# Patient Record
Sex: FEMALE | Race: BLACK OR AFRICAN AMERICAN | NOT HISPANIC OR LATINO | Employment: FULL TIME | URBAN - METROPOLITAN AREA
[De-identification: names, ages, dates, MRNs, and addresses within clinical notes are randomized per-mention and may not be internally consistent; named-entity substitution may affect disease eponyms.]

---

## 2024-02-15 DIAGNOSIS — Z12.31 OTHER SCREENING MAMMOGRAM: ICD-10-CM

## 2024-03-02 ENCOUNTER — PATIENT MESSAGE (OUTPATIENT)
Dept: ADMINISTRATIVE | Facility: HOSPITAL | Age: 59
End: 2024-03-02
Payer: COMMERCIAL

## 2024-03-02 DIAGNOSIS — I10 ESSENTIAL HYPERTENSION: ICD-10-CM

## 2024-03-02 DIAGNOSIS — E78.2 MIXED HYPERLIPIDEMIA: ICD-10-CM

## 2024-03-02 NOTE — TELEPHONE ENCOUNTER
No care due was identified.  [facility] Embedded Care Due Messages. Reference number: [id number].   3/02/2024 12:44:25 PM CST

## 2024-03-02 NOTE — TELEPHONE ENCOUNTER
No care due was identified.  [facility] Embedded Care Due Messages. Reference number: [id number].   3/02/2024 12:19:12 PM CST

## 2024-03-03 RX ORDER — LOSARTAN POTASSIUM 50 MG/1
50 TABLET ORAL DAILY
Qty: 90 TABLET | Refills: 2 | Status: SHIPPED | OUTPATIENT
Start: 2024-03-03

## 2024-03-03 RX ORDER — LOSARTAN POTASSIUM 50 MG/1
50 TABLET ORAL
Qty: 90 TABLET | Refills: 2 | OUTPATIENT
Start: 2024-03-03

## 2024-03-03 RX ORDER — ATORVASTATIN CALCIUM 20 MG/1
20 TABLET, FILM COATED ORAL DAILY
Qty: 90 TABLET | Refills: 2 | Status: SHIPPED | OUTPATIENT
Start: 2024-03-03

## 2024-03-03 RX ORDER — ATORVASTATIN CALCIUM 20 MG/1
20 TABLET, FILM COATED ORAL
Qty: 90 TABLET | Refills: 2 | OUTPATIENT
Start: 2024-03-03

## 2024-03-03 NOTE — TELEPHONE ENCOUNTER
Refill Decision Note   Rachael Alexis  is requesting a refill authorization.  Brief Assessment and Rationale for Refill:  Approve     Medication Therapy Plan:        Comments:     Note composed:2:39 AM 03/03/2024

## 2024-03-03 NOTE — TELEPHONE ENCOUNTER
Refill Decision Note   Rachael Reuben  is requesting a refill authorization.  Brief Assessment and Rationale for Refill:  Quick Discontinue     Medication Therapy Plan:  Duplicate      Comments:     Note composed:2:39 AM 03/03/2024

## 2024-03-05 ENCOUNTER — PATIENT OUTREACH (OUTPATIENT)
Dept: ADMINISTRATIVE | Facility: HOSPITAL | Age: 59
End: 2024-03-05
Payer: COMMERCIAL

## 2024-03-05 ENCOUNTER — PATIENT MESSAGE (OUTPATIENT)
Dept: ADMINISTRATIVE | Facility: HOSPITAL | Age: 59
End: 2024-03-05
Payer: COMMERCIAL

## 2024-03-05 NOTE — PROGRESS NOTES
Population Health Chart Review & Patient Outreach Details      Additional Pop Health Notes:               Updates Requested / Reviewed:             Health Maintenance Topics Overdue:    Health Maintenance Due   Topic Date Due    Cervical Cancer Screening  06/19/2020    COVID-19 Vaccine (3 - 2023-24 season) 09/01/2023    Mammogram  02/09/2024         Health Maintenance Topic(s) Outreach Outcomes & Actions Taken:    Cervical Cancer Screening - Outreach Outcomes & Actions Taken  : portal sent

## 2024-06-01 ENCOUNTER — PATIENT MESSAGE (OUTPATIENT)
Dept: ADMINISTRATIVE | Facility: HOSPITAL | Age: 59
End: 2024-06-01
Payer: COMMERCIAL

## 2024-06-07 ENCOUNTER — HOSPITAL ENCOUNTER (OUTPATIENT)
Dept: RADIOLOGY | Facility: HOSPITAL | Age: 59
Discharge: HOME | End: 2024-06-07
Attending: STUDENT IN AN ORGANIZED HEALTH CARE EDUCATION/TRAINING PROGRAM
Payer: COMMERCIAL

## 2024-06-07 VITALS — WEIGHT: 190 LBS | BODY MASS INDEX: 33.66 KG/M2 | HEIGHT: 63 IN

## 2024-06-07 DIAGNOSIS — Z12.31 OTHER SCREENING MAMMOGRAM: ICD-10-CM

## 2024-06-07 PROCEDURE — 77067 SCR MAMMO BI INCL CAD: CPT | Mod: TC

## 2024-06-07 PROCEDURE — 77063 BREAST TOMOSYNTHESIS BI: CPT | Mod: 26,,, | Performed by: RADIOLOGY

## 2024-06-07 PROCEDURE — 77067 SCR MAMMO BI INCL CAD: CPT | Mod: 26,,, | Performed by: RADIOLOGY

## 2024-06-07 PROCEDURE — 77063 BREAST TOMOSYNTHESIS BI: CPT | Mod: TC

## 2024-06-12 ENCOUNTER — OFFICE VISIT (OUTPATIENT)
Dept: OBSTETRICS AND GYNECOLOGY | Facility: CLINIC | Age: 59
End: 2024-06-12
Payer: COMMERCIAL

## 2024-06-12 VITALS
BODY MASS INDEX: 35.27 KG/M2 | HEIGHT: 63 IN | DIASTOLIC BLOOD PRESSURE: 86 MMHG | SYSTOLIC BLOOD PRESSURE: 146 MMHG | WEIGHT: 199.06 LBS

## 2024-06-12 DIAGNOSIS — Z01.419 WELL WOMAN EXAM WITH ROUTINE GYNECOLOGICAL EXAM: Primary | ICD-10-CM

## 2024-06-12 PROCEDURE — 99386 PREV VISIT NEW AGE 40-64: CPT | Mod: S$GLB,,, | Performed by: OBSTETRICS & GYNECOLOGY

## 2024-06-12 PROCEDURE — 4010F ACE/ARB THERAPY RXD/TAKEN: CPT | Mod: CPTII,S$GLB,, | Performed by: OBSTETRICS & GYNECOLOGY

## 2024-06-12 PROCEDURE — 1160F RVW MEDS BY RX/DR IN RCRD: CPT | Mod: CPTII,S$GLB,, | Performed by: OBSTETRICS & GYNECOLOGY

## 2024-06-12 PROCEDURE — 99999 PR PBB SHADOW E&M-EST. PATIENT-LVL III: CPT | Mod: PBBFAC,,, | Performed by: OBSTETRICS & GYNECOLOGY

## 2024-06-12 PROCEDURE — 1159F MED LIST DOCD IN RCRD: CPT | Mod: CPTII,S$GLB,, | Performed by: OBSTETRICS & GYNECOLOGY

## 2024-06-12 PROCEDURE — 3008F BODY MASS INDEX DOCD: CPT | Mod: CPTII,S$GLB,, | Performed by: OBSTETRICS & GYNECOLOGY

## 2024-06-12 PROCEDURE — 3079F DIAST BP 80-89 MM HG: CPT | Mod: CPTII,S$GLB,, | Performed by: OBSTETRICS & GYNECOLOGY

## 2024-06-12 PROCEDURE — 3077F SYST BP >= 140 MM HG: CPT | Mod: CPTII,S$GLB,, | Performed by: OBSTETRICS & GYNECOLOGY

## 2024-06-12 NOTE — PROGRESS NOTES
CC: Annual check-up    SUBJECTIVE:   58 y.o. female   for annual routine Pap and checkup. No LMP recorded. Patient is postmenopausal..  She has no unusual complaints.    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, thinking about retiring after 25 yrs, was a teacher for 19 yrs  Past Medical History:   Diagnosis Date    Diverticulitis     Family history of breast cancer 2017    Hyperlipidemia     Hypertension      Past Surgical History:   Procedure Laterality Date    APPENDECTOMY      CHOLECYSTECTOMY      COLONOSCOPY N/A 2017    Procedure: COLONOSCOPY;  Surgeon: Yohan Berrios Jr., MD;  Location: [facility];  Service: Endoscopy;  Laterality: N/A;    SALPINGOOPHORECTOMY Right      Social History     Socioeconomic History    Marital status:    Occupational History    Occupation: Teacher     Comment: [employer]   Tobacco Use    Smoking status: Every Day     Current packs/day: 0.06     Types: Cigarettes    Smokeless tobacco: Never   Substance and Sexual Activity    Alcohol use: Yes     Alcohol/week: 0.0 standard drinks of alcohol    Drug use: No    Sexual activity: Yes     Partners: Male     Family History   Problem Relation Name Age of Onset    Cancer Mother      Breast cancer Mother      Breast cancer Paternal Grandmother       OB History    Para Term  AB Living   2 2 2         SAB IAB Ectopic Multiple Live Births                  # Outcome Date GA Lbr Jeyson/2nd Weight Sex Type Anes PTL Lv   2 Term            1 Term                  Current Outpatient Medications   Medication Sig Dispense Refill    atorvastatin (LIPITOR) 20 MG tablet Take 1 tablet (20 mg total) by mouth once daily. 90 tablet 2    hydrocortisone 2.5 % cream Apply topically 2 (two) times daily. 28 g 1    losartan (COZAAR) 50 MG tablet Take 1 tablet (50 mg total) by mouth once  "daily. 90 tablet 2    hydrOXYzine HCL (ATARAX) 25 MG tablet Take 1 tablet (25 mg total) by mouth 3 (three) times daily as needed for Itching. (Patient not taking: Reported on 6/12/2024) 30 tablet 1    triamcinolone acetonide 0.5% (KENALOG) 0.5 % Crea Apply topically 2 (two) times daily. (Patient not taking: Reported on 6/12/2024) 15 g 3     No current facility-administered medications for this visit.     Allergies: Pcn [penicillins]     ROS:  Constitutional: no weight loss, weight gain, fever, fatigue  Eyes:  No vision changes, glasses/contacts  ENT/Mouth: No ulcers, sinus problems, ears ringing, headache  Cardiovascular: No inability to lie flat, chest pain, exercise intolerance, swelling, heart palpitations  Respiratory: No wheezing, coughing blood, shortness of breath, or cough  Gastrointestinal: No diarrhea, bloody stool, nausea/vomiting, constipation, gas, hemorrhoids  Genitourinary: No blood in urine, painful urination, urgency of urination, frequency of urination, incomplete emptying, incontinence, abnormal bleeding, painful periods, heavy periods, vaginal discharge, vaginal odor, painful intercourse, sexual problems, bleeding after intercourse.  Musculoskeletal: No muscle weakness  Skin/Breast: No painful breasts, nipple discharge, masses, rash, ulcers  Neurological: No passing out, seizures, numbness, headache  Endocrine: No diabetes, hypothyroid, hyperthyroid, hot flashes, hair loss, abnormal hair growth, ance  Psychiatric: No depression, crying  Hematologic: No bruises, bleeding, swollen lymph nodes, anemia.      OBJECTIVE:   The patient appears well, alert, oriented x 3, in no distress.  BP (!) 146/86   Ht 5' 3" (1.6 m)   Wt 90.3 kg (199 lb 1.2 oz)   BMI 35.26 kg/m²   NECK: no thyromegaly, trachea midline  SKIN: no acne, striae, hirsutism  BREAST EXAM: breasts appear normal, no suspicious masses, no skin or nipple changes or axillary nodes  ABDOMEN: no hernias, masses, or " hepatosplenomegaly  GENITALIA: normal external genitalia, no erythema, no discharge  URETHRA: normal urethra, normal urethral meatus  VAGINA: mucosal atrophy  CERVIX: no lesions or cervical motion tenderness  UTERUS: normal  ADNEXA: normal adnexa and no mass, fullness, tenderness      ASSESSMENT:   well woman  1. Well woman exam with routine gynecological exam        PLAN:   mammogram  pap smear  return annually or prn  Orders Placed This Encounter    Liquid-Based Pap Smear, Screening

## 2024-06-19 ENCOUNTER — OFFICE VISIT (OUTPATIENT)
Dept: FAMILY MEDICINE | Facility: CLINIC | Age: 59
End: 2024-06-19
Payer: COMMERCIAL

## 2024-06-19 VITALS
OXYGEN SATURATION: 95 % | HEART RATE: 101 BPM | WEIGHT: 199.63 LBS | HEIGHT: 63 IN | SYSTOLIC BLOOD PRESSURE: 136 MMHG | DIASTOLIC BLOOD PRESSURE: 86 MMHG | BODY MASS INDEX: 35.37 KG/M2

## 2024-06-19 DIAGNOSIS — I10 ESSENTIAL HYPERTENSION: ICD-10-CM

## 2024-06-19 DIAGNOSIS — N39.3 STRESS INCONTINENCE OF URINE: ICD-10-CM

## 2024-06-19 DIAGNOSIS — Z00.00 WELLNESS EXAMINATION: Primary | ICD-10-CM

## 2024-06-19 DIAGNOSIS — E78.2 MIXED HYPERLIPIDEMIA: ICD-10-CM

## 2024-06-19 DIAGNOSIS — R73.9 HYPERGLYCEMIA: ICD-10-CM

## 2024-06-19 DIAGNOSIS — E66.01 SEVERE OBESITY (BMI 35.0-39.9) WITH COMORBIDITY: ICD-10-CM

## 2024-06-19 PROCEDURE — 3079F DIAST BP 80-89 MM HG: CPT | Mod: CPTII,S$GLB,, | Performed by: STUDENT IN AN ORGANIZED HEALTH CARE EDUCATION/TRAINING PROGRAM

## 2024-06-19 PROCEDURE — 3075F SYST BP GE 130 - 139MM HG: CPT | Mod: CPTII,S$GLB,, | Performed by: STUDENT IN AN ORGANIZED HEALTH CARE EDUCATION/TRAINING PROGRAM

## 2024-06-19 PROCEDURE — 4010F ACE/ARB THERAPY RXD/TAKEN: CPT | Mod: CPTII,S$GLB,, | Performed by: STUDENT IN AN ORGANIZED HEALTH CARE EDUCATION/TRAINING PROGRAM

## 2024-06-19 PROCEDURE — 99396 PREV VISIT EST AGE 40-64: CPT | Mod: S$GLB,,, | Performed by: STUDENT IN AN ORGANIZED HEALTH CARE EDUCATION/TRAINING PROGRAM

## 2024-06-19 PROCEDURE — 1160F RVW MEDS BY RX/DR IN RCRD: CPT | Mod: CPTII,S$GLB,, | Performed by: STUDENT IN AN ORGANIZED HEALTH CARE EDUCATION/TRAINING PROGRAM

## 2024-06-19 PROCEDURE — 1159F MED LIST DOCD IN RCRD: CPT | Mod: CPTII,S$GLB,, | Performed by: STUDENT IN AN ORGANIZED HEALTH CARE EDUCATION/TRAINING PROGRAM

## 2024-06-19 PROCEDURE — 3008F BODY MASS INDEX DOCD: CPT | Mod: CPTII,S$GLB,, | Performed by: STUDENT IN AN ORGANIZED HEALTH CARE EDUCATION/TRAINING PROGRAM

## 2024-06-19 NOTE — PROGRESS NOTES
Patient ID: Rachael Alexis is a 58 y.o. female.     Chief Complaint: wellness    HPI   Patient here for annual wellness exam. She has no new complaints today. She plans to have dental surgery soon and would like to have blood work done prior to surgery. She denies recent chest pain and shortness of breath. She denies fevers and chills. She reports continued urinary incontinence.     Review of Systems  Review of Systems   Constitutional:  Negative for fever.   HENT:  Negative for ear pain, hearing loss and sinus pain.    Eyes:  Negative for discharge.   Respiratory:  Negative for cough, shortness of breath and wheezing.    Cardiovascular:  Negative for chest pain, palpitations and leg swelling.   Gastrointestinal:  Negative for blood in stool, constipation, diarrhea, nausea and vomiting.   Genitourinary:  Negative for dysuria, hematuria and urgency.   Musculoskeletal:  Negative for myalgias and neck pain.   Skin:  Negative for rash.   Neurological:  Negative for weakness and headaches.   Endo/Heme/Allergies:  Negative for polydipsia.   Psychiatric/Behavioral:  Negative for depression.    All other systems reviewed and are negative.      Currently Medications  Current Outpatient Medications on File Prior to Visit   Medication Sig Dispense Refill    atorvastatin (LIPITOR) 20 MG tablet Take 1 tablet (20 mg total) by mouth once daily. 90 tablet 2    hydrocortisone 2.5 % cream Apply topically 2 (two) times daily. 28 g 1    losartan (COZAAR) 50 MG tablet Take 1 tablet (50 mg total) by mouth once daily. 90 tablet 2    hydrOXYzine HCL (ATARAX) 25 MG tablet Take 1 tablet (25 mg total) by mouth 3 (three) times daily as needed for Itching. (Patient not taking: Reported on 6/12/2024) 30 tablet 1    triamcinolone acetonide 0.5% (KENALOG) 0.5 % Crea Apply topically 2 (two) times daily. (Patient not taking: Reported on 6/12/2024) 15 g 3     No current facility-administered medications on file prior to visit.       Physical   "Exam  Vitals:    06/19/24 1056   BP: 136/86   BP Location: Left arm   Patient Position: Sitting   Pulse: 101   SpO2: 95%   Weight: 90.5 kg (199 lb 10 oz)   Height: 5' 3" (1.6 m)      Body mass index is 35.36 kg/m².  Wt Readings from Last 3 Encounters:   06/19/24 90.5 kg (199 lb 10 oz)   06/12/24 90.3 kg (199 lb 1.2 oz)   06/07/24 86.2 kg (190 lb)       Physical Exam  Vitals and nursing note reviewed.   Constitutional:       General: She is not in acute distress.     Appearance: She is not ill-appearing.   HENT:      Head: Normocephalic and atraumatic.      Right Ear: External ear normal.      Left Ear: External ear normal.      Nose: Nose normal.      Mouth/Throat:      Mouth: Mucous membranes are moist.   Eyes:      Extraocular Movements: Extraocular movements intact.      Conjunctiva/sclera: Conjunctivae normal.   Cardiovascular:      Rate and Rhythm: Normal rate and regular rhythm.      Pulses: Normal pulses.      Heart sounds: No murmur heard.  Pulmonary:      Effort: Pulmonary effort is normal. No respiratory distress.      Breath sounds: No wheezing.   Abdominal:      General: There is no distension.      Palpations: Abdomen is soft. There is no mass.      Tenderness: There is no abdominal tenderness.   Musculoskeletal:         General: No swelling.      Cervical back: Normal range of motion.   Skin:     Coloration: Skin is not jaundiced.      Findings: No rash.   Neurological:      General: No focal deficit present.      Mental Status: She is alert and oriented to person, place, and time.   Psychiatric:         Mood and Affect: Mood normal.         Thought Content: Thought content normal.         Labs:    Complete Blood Count  Lab Results   Component Value Date    RBC 4.86 11/01/2023    HGB 14.3 11/01/2023    HCT 44.1 11/01/2023    MCV 91 11/01/2023    MCH 29.4 11/01/2023    MCHC 32.4 11/01/2023    RDW 14.0 11/01/2023     11/01/2023    MPV 9.6 11/01/2023    GRAN 6.4 11/01/2023    GRAN 70.8 11/01/2023 " "   LYMPH 1.8 11/01/2023    LYMPH 19.2 11/01/2023    MONO 0.6 11/01/2023    MONO 6.9 11/01/2023    EOS 0.1 11/01/2023    BASO 0.05 11/01/2023    EOSINOPHIL 1.0 11/01/2023    BASOPHIL 0.5 11/01/2023    DIFFMETHOD Automated 11/01/2023       Comprehensive Metabolic Panel  No results found for: "GLU", "BUN", "CREATININE", "NA", "K", "CL", "PROT", "ALBUMIN", "BILITOT", "AST", "ALKPHOS", "CO2", "ALT", "ANIONGAP", "EGFRNONAA", "ESTGFRAFRICA"    TSH  No results found for: "TSH"    Imaging:  Mammo Digital Screening Bilat w/ Matheus  Narrative: Result:   Mammo Digital Screening Bilat w/ Matheus     History:  Patient is 58 y.o. and is seen for a screening mammogram.    Films Compared:  Compared to: 02/09/2023 Mammo Digital Screening Bilat w/ Matheus, 01/04/2022   Mammo Digital Screening Bilat w/ Matheus, and 07/01/2020 Mammo Digital   Screening Bilat w/ Matheus     Findings:  This procedure was performed using tomosynthesis. Computer-aided detection   was utilized in the interpretation of this examination.  There are scattered areas of fibroglandular density. There is no evidence   of suspicious masses, calcifications, or other abnormal findings.  Impression: Bilateral  There is no mammographic evidence of malignancy.    BI-RADS Category:   Overall: 1 - Negative       Recommendation:  Routine screening mammogram in 1 year is recommended.    Your estimated lifetime risk of breast cancer (to age 85) based on   Tyrer-Cuzick risk assessment model is Tyrer-Cuzick: 17.15%. According to   the American Cancer Society, patients with a lifetime breast cancer risk   of 20% or higher might benefit from supplemental screening tests.      Assessment/Plan:    1. Wellness examination    2. Mixed hyperlipidemia  -     LIPID PANEL; Future; Expected date: 06/19/2024    3. Essential hypertension  -     CBC Auto Differential; Future  -     Comprehensive metabolic panel; Future; Expected date: 06/19/2024  -     TSH; Future; Expected date: 06/19/2024    4. " Hyperglycemia  -     HEMOGLOBIN A1C; Future; Expected date: 06/19/2024    5. Stress incontinence of urine  -     Ambulatory referral/consult to Physical/Occupational Therapy; Future; Expected date: 06/26/2024    6. Severe obesity (BMI 35.0-39.9) with comorbidity  Assessment & Plan:  Body mass index is 35.36 kg/m².  Discussed diet and exercise           Discussed how to stay healthy including: diet, exercise, refraining from smoking and discussed screening exams / tests needed for age, sex and family Hx.      Maxx Yang MD      Answers submitted by the patient for this visit:  Review of Systems Questionnaire (Submitted on 6/18/2024)  activity change: No  unexpected weight change: No  rhinorrhea: No  trouble swallowing: No  visual disturbance: No  chest tightness: No  polyuria: No  difficulty urinating: No  menstrual problem: No  joint swelling: No  arthralgias: No  confusion: No  dysphoric mood: No

## 2024-06-21 ENCOUNTER — LAB VISIT (OUTPATIENT)
Dept: LAB | Facility: HOSPITAL | Age: 59
End: 2024-06-21
Attending: STUDENT IN AN ORGANIZED HEALTH CARE EDUCATION/TRAINING PROGRAM
Payer: COMMERCIAL

## 2024-06-21 DIAGNOSIS — R73.9 HYPERGLYCEMIA: ICD-10-CM

## 2024-06-21 DIAGNOSIS — I10 ESSENTIAL HYPERTENSION: ICD-10-CM

## 2024-06-21 DIAGNOSIS — E78.2 MIXED HYPERLIPIDEMIA: ICD-10-CM

## 2024-06-21 LAB
ALBUMIN SERPL BCP-MCNC: 4.3 G/DL (ref 3.5–5.2)
ALP SERPL-CCNC: 126 U/L (ref 38–126)
ALT SERPL W/O P-5'-P-CCNC: 15 U/L (ref 10–44)
ANION GAP SERPL CALC-SCNC: 8 MMOL/L (ref 8–16)
AST SERPL-CCNC: 21 U/L (ref 15–46)
BASOPHILS # BLD AUTO: 0.07 K/UL (ref 0–0.2)
BASOPHILS NFR BLD: 0.7 % (ref 0–1.9)
BILIRUB SERPL-MCNC: 0.7 MG/DL (ref 0.1–1)
CALCIUM SERPL-MCNC: 9.6 MG/DL (ref 8.7–10.5)
CHLORIDE SERPL-SCNC: 108 MMOL/L (ref 95–110)
CHOLEST SERPL-MCNC: 138 MG/DL (ref 120–199)
CHOLEST/HDLC SERPL: 4.5 {RATIO} (ref 2–5)
CO2 SERPL-SCNC: 28 MMOL/L (ref 23–29)
CREAT SERPL-MCNC: 0.71 MG/DL (ref 0.5–1.4)
DIFFERENTIAL METHOD BLD: ABNORMAL
EOSINOPHIL # BLD AUTO: 0.1 K/UL (ref 0–0.5)
EOSINOPHIL NFR BLD: 0.8 % (ref 0–8)
ERYTHROCYTE [DISTWIDTH] IN BLOOD BY AUTOMATED COUNT: 13.8 % (ref 11.5–14.5)
EST. GFR  (NO RACE VARIABLE): >60 ML/MIN/1.73 M^2
ESTIMATED AVG GLUCOSE: 114 MG/DL (ref 68–131)
GLUCOSE SERPL-MCNC: 94 MG/DL (ref 70–110)
HBA1C MFR BLD: 5.6 % (ref 4–5.6)
HCT VFR BLD AUTO: 44.9 % (ref 37–48.5)
HDLC SERPL-MCNC: 31 MG/DL (ref 40–75)
HDLC SERPL: 22.5 % (ref 20–50)
HGB BLD-MCNC: 14.7 G/DL (ref 12–16)
IMM GRANULOCYTES # BLD AUTO: 0.05 K/UL (ref 0–0.04)
IMM GRANULOCYTES NFR BLD AUTO: 0.5 % (ref 0–0.5)
LDLC SERPL CALC-MCNC: 90.8 MG/DL (ref 63–159)
LYMPHOCYTES # BLD AUTO: 2.1 K/UL (ref 1–4.8)
LYMPHOCYTES NFR BLD: 20.7 % (ref 18–48)
MCH RBC QN AUTO: 29.3 PG (ref 27–31)
MCHC RBC AUTO-ENTMCNC: 32.7 G/DL (ref 32–36)
MCV RBC AUTO: 90 FL (ref 82–98)
MONOCYTES # BLD AUTO: 0.6 K/UL (ref 0.3–1)
MONOCYTES NFR BLD: 6.4 % (ref 4–15)
NEUTROPHILS # BLD AUTO: 7.1 K/UL (ref 1.8–7.7)
NEUTROPHILS NFR BLD: 70.9 % (ref 38–73)
NONHDLC SERPL-MCNC: 107 MG/DL
NRBC BLD-RTO: 0 /100 WBC
PLATELET # BLD AUTO: 269 K/UL (ref 150–450)
PMV BLD AUTO: 9.3 FL (ref 9.2–12.9)
POTASSIUM SERPL-SCNC: 4.8 MMOL/L (ref 3.5–5.1)
PROT SERPL-MCNC: 7.7 G/DL (ref 6–8.4)
RBC # BLD AUTO: 5.01 M/UL (ref 4–5.4)
SODIUM SERPL-SCNC: 144 MMOL/L (ref 136–145)
TRIGL SERPL-MCNC: 81 MG/DL (ref 30–150)
TSH SERPL DL<=0.005 MIU/L-ACNC: 0.85 UIU/ML (ref 0.4–4)
UUN UR-MCNC: 9 MG/DL (ref 7–17)
WBC # BLD AUTO: 10.05 K/UL (ref 3.9–12.7)

## 2024-06-21 PROCEDURE — 83036 HEMOGLOBIN GLYCOSYLATED A1C: CPT | Performed by: STUDENT IN AN ORGANIZED HEALTH CARE EDUCATION/TRAINING PROGRAM

## 2024-06-21 PROCEDURE — 36415 COLL VENOUS BLD VENIPUNCTURE: CPT | Mod: PN | Performed by: STUDENT IN AN ORGANIZED HEALTH CARE EDUCATION/TRAINING PROGRAM

## 2024-06-21 PROCEDURE — 80061 LIPID PANEL: CPT | Performed by: STUDENT IN AN ORGANIZED HEALTH CARE EDUCATION/TRAINING PROGRAM

## 2024-06-21 PROCEDURE — 80053 COMPREHEN METABOLIC PANEL: CPT | Mod: PN | Performed by: STUDENT IN AN ORGANIZED HEALTH CARE EDUCATION/TRAINING PROGRAM

## 2024-06-21 PROCEDURE — 85025 COMPLETE CBC W/AUTO DIFF WBC: CPT | Mod: PN | Performed by: STUDENT IN AN ORGANIZED HEALTH CARE EDUCATION/TRAINING PROGRAM

## 2024-06-21 PROCEDURE — 84443 ASSAY THYROID STIM HORMONE: CPT | Mod: PN | Performed by: STUDENT IN AN ORGANIZED HEALTH CARE EDUCATION/TRAINING PROGRAM

## 2024-07-03 ENCOUNTER — TELEPHONE (OUTPATIENT)
Dept: FAMILY MEDICINE | Facility: CLINIC | Age: 59
End: 2024-07-03
Payer: COMMERCIAL

## 2024-07-03 NOTE — TELEPHONE ENCOUNTER
----- Message from Mili Nettles sent at 7/3/2024  1:41 PM CDT -----  Regarding: Therapy Order  Good Morning/Afternoon,    The physical therapy department received your order to get the attached patient scheduled for an evaluation. We have reached out to the patient and scheduled him/her for an evaluation; however, they have cancelled their appointment.We have made several attempts to get the patient rescheduled but have been unsuccessful.     We wanted you to be aware that your patient has not started therapy. If you speak with them again about starting therapy please have them reach out to us at [phone] to get scheduled?.    Sincerely,    Mili Nettles

## 2024-12-02 DIAGNOSIS — I10 ESSENTIAL HYPERTENSION: ICD-10-CM

## 2024-12-02 DIAGNOSIS — E78.2 MIXED HYPERLIPIDEMIA: ICD-10-CM

## 2024-12-02 RX ORDER — LOSARTAN POTASSIUM 50 MG/1
50 TABLET ORAL DAILY
Qty: 90 TABLET | Refills: 2 | Status: SHIPPED | OUTPATIENT
Start: 2024-12-02

## 2024-12-02 RX ORDER — ATORVASTATIN CALCIUM 20 MG/1
20 TABLET, FILM COATED ORAL DAILY
Qty: 90 TABLET | Refills: 2 | Status: SHIPPED | OUTPATIENT
Start: 2024-12-02

## 2024-12-02 NOTE — TELEPHONE ENCOUNTER
No care due was identified.  [facility] Embedded Care Due Messages. Reference number: [id number].   12/02/2024 7:05:41 AM CST

## 2025-06-14 ENCOUNTER — PATIENT MESSAGE (OUTPATIENT)
Dept: FAMILY MEDICINE | Facility: CLINIC | Age: 60
End: 2025-06-14
Payer: COMMERCIAL

## 2025-06-19 ENCOUNTER — HOSPITAL ENCOUNTER (OUTPATIENT)
Dept: RADIOLOGY | Facility: HOSPITAL | Age: 60
Discharge: HOME | End: 2025-06-19
Attending: STUDENT IN AN ORGANIZED HEALTH CARE EDUCATION/TRAINING PROGRAM
Payer: COMMERCIAL

## 2025-06-19 DIAGNOSIS — Z12.31 ENCOUNTER FOR SCREENING MAMMOGRAM FOR BREAST CANCER: ICD-10-CM

## 2025-06-19 PROCEDURE — 77063 BREAST TOMOSYNTHESIS BI: CPT | Mod: TC

## 2025-06-20 ENCOUNTER — LAB VISIT (OUTPATIENT)
Dept: LAB | Facility: HOSPITAL | Age: 60
End: 2025-06-20
Attending: PHYSICIAN ASSISTANT
Payer: COMMERCIAL

## 2025-06-20 ENCOUNTER — OFFICE VISIT (OUTPATIENT)
Dept: FAMILY MEDICINE | Facility: CLINIC | Age: 60
End: 2025-06-20
Payer: COMMERCIAL

## 2025-06-20 VITALS
HEIGHT: 63 IN | OXYGEN SATURATION: 97 % | DIASTOLIC BLOOD PRESSURE: 82 MMHG | TEMPERATURE: 98 F | BODY MASS INDEX: 37.91 KG/M2 | WEIGHT: 213.94 LBS | HEART RATE: 95 BPM | SYSTOLIC BLOOD PRESSURE: 120 MMHG

## 2025-06-20 DIAGNOSIS — E55.9 VITAMIN D DEFICIENCY: ICD-10-CM

## 2025-06-20 DIAGNOSIS — I10 ESSENTIAL HYPERTENSION: ICD-10-CM

## 2025-06-20 DIAGNOSIS — E78.2 MIXED HYPERLIPIDEMIA: ICD-10-CM

## 2025-06-20 DIAGNOSIS — R73.9 HYPERGLYCEMIA: ICD-10-CM

## 2025-06-20 DIAGNOSIS — I10 ESSENTIAL HYPERTENSION: Primary | ICD-10-CM

## 2025-06-20 DIAGNOSIS — E66.01 SEVERE OBESITY (BMI 35.0-39.9) WITH COMORBIDITY: ICD-10-CM

## 2025-06-20 LAB
25(OH)D3+25(OH)D2 SERPL-MCNC: 30 NG/ML (ref 30–96)
ABSOLUTE EOSINOPHIL (OHS): 0.05 K/UL
ABSOLUTE MONOCYTE (OHS): 0.42 K/UL (ref 0.3–1)
ABSOLUTE NEUTROPHIL COUNT (OHS): 3.27 K/UL (ref 1.8–7.7)
ALBUMIN SERPL BCP-MCNC: 4.2 G/DL (ref 3.5–5.2)
ALP SERPL-CCNC: 103 UNIT/L (ref 38–126)
ALT SERPL W/O P-5'-P-CCNC: 21 UNIT/L (ref 10–44)
ANION GAP (OHS): 6 MMOL/L (ref 8–16)
AST SERPL-CCNC: 21 UNIT/L (ref 15–46)
BASOPHILS # BLD AUTO: 0.04 K/UL
BASOPHILS NFR BLD AUTO: 0.8 %
BILIRUB SERPL-MCNC: 0.6 MG/DL (ref 0.1–1)
BUN SERPL-MCNC: 13 MG/DL (ref 7–17)
CALCIUM SERPL-MCNC: 9.3 MG/DL (ref 8.7–10.5)
CHLORIDE SERPL-SCNC: 104 MMOL/L (ref 95–110)
CHOLEST SERPL-MCNC: 133 MG/DL (ref 120–199)
CHOLEST/HDLC SERPL: 3.4 {RATIO} (ref 2–5)
CO2 SERPL-SCNC: 30 MMOL/L (ref 23–29)
CREAT SERPL-MCNC: 0.7 MG/DL (ref 0.5–1.4)
EAG (OHS): 123 MG/DL (ref 68–131)
ERYTHROCYTE [DISTWIDTH] IN BLOOD BY AUTOMATED COUNT: 13.7 % (ref 11.5–14.5)
GFR SERPLBLD CREATININE-BSD FMLA CKD-EPI: >60 ML/MIN/1.73/M2
GLUCOSE SERPL-MCNC: 88 MG/DL (ref 70–110)
HBA1C MFR BLD: 5.9 % (ref 4–5.6)
HCT VFR BLD AUTO: 39.9 % (ref 37–48.5)
HDLC SERPL-MCNC: 39 MG/DL (ref 40–75)
HDLC SERPL: 29.3 % (ref 20–50)
HGB BLD-MCNC: 12.8 GM/DL (ref 12–16)
IMM GRANULOCYTES # BLD AUTO: 0.04 K/UL (ref 0–0.04)
IMM GRANULOCYTES NFR BLD AUTO: 0.8 % (ref 0–0.5)
INSULIN SERPL-ACNC: 17 UU/ML
LDLC SERPL CALC-MCNC: 76.8 MG/DL (ref 63–159)
LYMPHOCYTES # BLD AUTO: 1.51 K/UL (ref 1–4.8)
MCH RBC QN AUTO: 27.5 PG (ref 27–31)
MCHC RBC AUTO-ENTMCNC: 32.1 G/DL (ref 32–36)
MCV RBC AUTO: 86 FL (ref 82–98)
NONHDLC SERPL-MCNC: 94 MG/DL
NUCLEATED RBC (/100WBC) (OHS): 0 /100 WBC
PLATELET # BLD AUTO: 211 K/UL (ref 150–450)
PMV BLD AUTO: 9.6 FL (ref 9.2–12.9)
POTASSIUM SERPL-SCNC: 4.3 MMOL/L (ref 3.5–5.1)
PROT SERPL-MCNC: 7.7 GM/DL (ref 6–8.4)
RBC # BLD AUTO: 4.65 M/UL (ref 4–5.4)
RELATIVE EOSINOPHIL (OHS): 0.9 %
RELATIVE LYMPHOCYTE (OHS): 28.3 % (ref 18–48)
RELATIVE MONOCYTE (OHS): 7.9 % (ref 4–15)
RELATIVE NEUTROPHIL (OHS): 61.3 % (ref 38–73)
SODIUM SERPL-SCNC: 140 MMOL/L (ref 136–145)
T4 FREE SERPL-MCNC: 0.92 NG/DL (ref 0.71–1.51)
TRIGL SERPL-MCNC: 86 MG/DL (ref 30–150)
TSH SERPL-ACNC: <0.026 UIU/ML (ref 0.4–4)
WBC # BLD AUTO: 5.33 K/UL (ref 3.9–12.7)

## 2025-06-20 PROCEDURE — 1160F RVW MEDS BY RX/DR IN RCRD: CPT | Mod: CPTII,S$GLB,, | Performed by: PHYSICIAN ASSISTANT

## 2025-06-20 PROCEDURE — 83036 HEMOGLOBIN GLYCOSYLATED A1C: CPT | Mod: PN

## 2025-06-20 PROCEDURE — 4010F ACE/ARB THERAPY RXD/TAKEN: CPT | Mod: CPTII,S$GLB,, | Performed by: PHYSICIAN ASSISTANT

## 2025-06-20 PROCEDURE — 82306 VITAMIN D 25 HYDROXY: CPT | Mod: PN

## 2025-06-20 PROCEDURE — 36415 COLL VENOUS BLD VENIPUNCTURE: CPT | Mod: PN

## 2025-06-20 PROCEDURE — 99214 OFFICE O/P EST MOD 30 MIN: CPT | Mod: S$GLB,,, | Performed by: PHYSICIAN ASSISTANT

## 2025-06-20 PROCEDURE — 82465 ASSAY BLD/SERUM CHOLESTEROL: CPT | Mod: PN

## 2025-06-20 PROCEDURE — 84439 ASSAY OF FREE THYROXINE: CPT | Mod: PN

## 2025-06-20 PROCEDURE — 3008F BODY MASS INDEX DOCD: CPT | Mod: CPTII,S$GLB,, | Performed by: PHYSICIAN ASSISTANT

## 2025-06-20 PROCEDURE — 85025 COMPLETE CBC W/AUTO DIFF WBC: CPT | Mod: PN

## 2025-06-20 PROCEDURE — 82040 ASSAY OF SERUM ALBUMIN: CPT | Mod: PN

## 2025-06-20 PROCEDURE — 3074F SYST BP LT 130 MM HG: CPT | Mod: CPTII,S$GLB,, | Performed by: PHYSICIAN ASSISTANT

## 2025-06-20 PROCEDURE — 3079F DIAST BP 80-89 MM HG: CPT | Mod: CPTII,S$GLB,, | Performed by: PHYSICIAN ASSISTANT

## 2025-06-20 PROCEDURE — 83525 ASSAY OF INSULIN: CPT | Mod: PN

## 2025-06-20 PROCEDURE — 1159F MED LIST DOCD IN RCRD: CPT | Mod: CPTII,S$GLB,, | Performed by: PHYSICIAN ASSISTANT

## 2025-06-20 PROCEDURE — 3044F HG A1C LEVEL LT 7.0%: CPT | Mod: CPTII,S$GLB,, | Performed by: PHYSICIAN ASSISTANT

## 2025-06-20 PROCEDURE — 84443 ASSAY THYROID STIM HORMONE: CPT | Mod: PN

## 2025-06-20 RX ORDER — SEMAGLUTIDE 0.25 MG/.5ML
0.25 INJECTION, SOLUTION SUBCUTANEOUS
Qty: 2 ML | Refills: 0 | Status: SHIPPED | OUTPATIENT
Start: 2025-06-20

## 2025-06-20 RX ORDER — LOSARTAN POTASSIUM 50 MG/1
50 TABLET ORAL DAILY
Qty: 90 TABLET | Refills: 3 | Status: SHIPPED | OUTPATIENT
Start: 2025-06-20

## 2025-06-20 RX ORDER — LOSARTAN POTASSIUM 50 MG/1
50 TABLET ORAL DAILY
Qty: 90 TABLET | Refills: 2 | Status: CANCELLED | OUTPATIENT
Start: 2025-06-20

## 2025-06-20 RX ORDER — ATORVASTATIN CALCIUM 20 MG/1
20 TABLET, FILM COATED ORAL DAILY
Qty: 90 TABLET | Refills: 3 | Status: SHIPPED | OUTPATIENT
Start: 2025-06-20

## 2025-06-20 RX ORDER — ATORVASTATIN CALCIUM 20 MG/1
20 TABLET, FILM COATED ORAL DAILY
Qty: 90 TABLET | Refills: 2 | Status: CANCELLED | OUTPATIENT
Start: 2025-06-20

## 2025-06-23 ENCOUNTER — RESULTS FOLLOW-UP (OUTPATIENT)
Dept: FAMILY MEDICINE | Facility: CLINIC | Age: 60
End: 2025-06-23
Payer: COMMERCIAL

## 2025-06-23 DIAGNOSIS — R79.89 LOW TSH LEVEL: Primary | ICD-10-CM

## 2025-06-24 ENCOUNTER — RESULTS FOLLOW-UP (OUTPATIENT)
Dept: FAMILY MEDICINE | Facility: CLINIC | Age: 60
End: 2025-06-24

## 2025-06-25 ENCOUNTER — TELEPHONE (OUTPATIENT)
Dept: FAMILY MEDICINE | Facility: CLINIC | Age: 60
End: 2025-06-25
Payer: COMMERCIAL

## 2025-06-25 NOTE — PROGRESS NOTES
" Patient ID: Rachael Alexis is a 59 y.o. female.     Chief Complaint: Follow-up    Ms. Alexis is a 59 year old female with history of HTN, HLD who presents today for medication refills    She has a history of hypertension, hyperlipidemia, breast fibrosis (characterized by dense breast tissue on mammograms), diverticulitis, and colitis. She denies any current bowel or heart troubles.    She continues medications for hypertension and statin for cholesterol management, both requiring refills.    She quit smoking 1 year ago and reports associated weight gain since cessation.    She expresses interest in medical assistance for weight loss.    She completed a mammogram yesterday at [facility] due to fibrocystic breast tissue..    She denies any current issues with sleep or bowel function.       Review of Systems  Review of Systems   Constitutional:  Negative for fever.   HENT:  Negative for ear pain and sinus pain.    Eyes:  Negative for discharge.   Respiratory:  Negative for cough and wheezing.    Cardiovascular:  Negative for chest pain and leg swelling.   Gastrointestinal:  Negative for diarrhea, nausea and vomiting.   Genitourinary:  Negative for urgency.   Musculoskeletal:  Negative for myalgias.   Skin:  Negative for rash.   Neurological:  Negative for weakness and headaches.   Psychiatric/Behavioral:  Negative for depression.        Currently Medications  Medications Ordered Prior to Encounter[1]    Physical  Exam  Vitals:    06/20/25 0951   BP: 120/82   BP Location: Right arm   Patient Position: Sitting   Pulse: 95   Temp: 98.1 °F (36.7 °C)   TempSrc: Oral   SpO2: 97%   Weight: 97 kg (213 lb 15.3 oz)   Height: 5' 3" (1.6 m)      Body mass index is 37.9 kg/m².  Wt Readings from Last 3 Encounters:   06/20/25 97 kg (213 lb 15.3 oz)   06/19/24 90.5 kg (199 lb 10 oz)   06/12/24 90.3 kg (199 lb 1.2 oz)       Physical Exam  Vitals and nursing note reviewed.   Constitutional:       General: She is not in acute " distress.     Appearance: She is obese. She is not ill-appearing.   HENT:      Head: Normocephalic and atraumatic.      Right Ear: External ear normal.      Left Ear: External ear normal.      Nose: Nose normal.      Mouth/Throat:      Mouth: Mucous membranes are moist.   Eyes:      Extraocular Movements: Extraocular movements intact.      Conjunctiva/sclera: Conjunctivae normal.   Cardiovascular:      Rate and Rhythm: Normal rate and regular rhythm.      Pulses: Normal pulses.      Heart sounds: No murmur heard.  Pulmonary:      Effort: Pulmonary effort is normal. No respiratory distress.      Breath sounds: No wheezing.   Abdominal:      General: There is no distension.      Palpations: Abdomen is soft. There is no mass.      Tenderness: There is no abdominal tenderness.   Musculoskeletal:         General: No swelling.      Cervical back: Normal range of motion.   Skin:     Coloration: Skin is not jaundiced.      Findings: No rash.   Neurological:      General: No focal deficit present.      Mental Status: She is alert and oriented to person, place, and time.   Psychiatric:         Mood and Affect: Mood normal.         Thought Content: Thought content normal.         Labs:    Complete Blood Count  Lab Results   Component Value Date    RBC 4.65 06/20/2025    HGB 12.8 06/20/2025    HCT 39.9 06/20/2025    MCV 86 06/20/2025    MCH 27.5 06/20/2025    MCHC 32.1 06/20/2025    RDW 13.7 06/20/2025     06/20/2025    MPV 9.6 06/20/2025    GRAN 7.1 06/21/2024    GRAN 70.9 06/21/2024    LYMPH 28.3 06/20/2025    LYMPH 1.51 06/20/2025    MONO 7.9 06/20/2025    MONO 0.42 06/20/2025    EOS 0.9 06/20/2025    EOS 0.05 06/20/2025    BASO 0.07 06/21/2024    EOSINOPHIL 0.8 06/21/2024    BASOPHIL 0.8 06/20/2025    BASOPHIL 0.04 06/20/2025    DIFFMETHOD Automated 06/21/2024       Comprehensive Metabolic Panel  Lab Results   Component Value Date    GLU 88 06/20/2025    BUN 13 06/20/2025    CREATININE 0.7 06/20/2025      06/20/2025    K 4.3 06/20/2025     06/20/2025    PROT 7.7 06/20/2025    ALBUMIN 4.2 06/20/2025    BILITOT 0.6 06/20/2025    AST 21 06/20/2025    ALKPHOS 103 06/20/2025    CO2 30 (H) 06/20/2025    ALT 21 06/20/2025    ANIONGAP 6 (L) 06/20/2025       TSH  Lab Results   Component Value Date    TSH <0.026 (L) 06/20/2025       Imaging:  Mammo Digital Screening Bilat w/ Matheus (XPD)  Narrative: Facility:  [facility] [address] [place] [address] [phone]    Name: Rachael Alexis    MRN: [id number]    Result:  Mammo Digital Screening Bilat w/ Matheus (XPD)    History:  Patient is 59 y.o. and is seen for a screening mammogram.    Films Compared:  Compared to: 06/07/2024 Mammo Digital Screening Bilat w/ Matheus, 02/09/2023   Mammo Digital Screening Bilat w/ Matheus, and 01/04/2022 Mammo Digital   Screening Bilat w/ Matheus     Findings:  This procedure was performed using tomosynthesis.   Computer-aided detection was utilized in the interpretation of this   examination.    There are scattered areas of fibroglandular density. There is no evidence   of suspicious masses, microcalcifications or architectural distortion.  Impression:    No mammographic evidence of malignancy.    BI-RADS Category 1: Negative    Recommendation:  Routine screening mammogram in 1 year is recommended.    Your estimated lifetime risk of breast cancer (to age 85) based on   Tyrer-Cuzick risk assessment model is 17.25%.  According to the American   Cancer Society, patients with a lifetime breast cancer risk of 20% or   higher might benefit from supplemental screening tests, such as screening   breast MRI.    Electronically signed by,  Carlos Perez MD      Assessment/Plan:    1. Essential hypertension  -     Comprehensive Metabolic Panel; Future; Expected date: 06/20/2025  -     TSH; Future; Expected date: 06/20/2025  -     Lipid Panel; Future; Expected date: 06/20/2025  -     CBC Auto Differential; Future;  Expected date: 06/20/2025  -     losartan (COZAAR) 50 MG tablet; Take 1 tablet (50 mg total) by mouth once daily.  Dispense: 90 tablet; Refill: 3  -     Comprehensive Metabolic Panel; Future; Expected date: 06/20/2026  -     TSH; Future; Expected date: 06/20/2026  -     Lipid Panel; Future; Expected date: 06/20/2026  -     CBC Auto Differential; Future; Expected date: 06/20/2026    2. Mixed hyperlipidemia  -     atorvastatin (LIPITOR) 20 MG tablet; Take 1 tablet (20 mg total) by mouth once daily.  Dispense: 90 tablet; Refill: 3    3. Hyperglycemia  -     Insulin, Random; Future; Expected date: 06/20/2025  -     Hemoglobin A1C; Future; Expected date: 06/20/2025    4. Vitamin D deficiency  -     Vitamin D; Future; Expected date: 06/20/2025    5. Severe obesity (BMI 35.0-39.9) with comorbidity  Assessment & Plan:  - Body mass index is 37.9 kg/m².  - Recommendation for healthy diet and increasing exercise as tolerated with goal of 150min/week. Recommend weight loss.      Orders:  -     semaglutide, weight loss, (WEGOVY) 0.25 mg/0.5 mL PnIj; Inject 0.25 mg into the skin every 7 days.  Dispense: 2 mL; Refill: 0       Assessment & Plan    I10 Essential (primary) hypertension  E78.5 Hyperlipidemia, unspecified  R63.5 Abnormal weight gain    HYPERTENSION:  - Continued the patient's antihypertensive medication and processed refill request.    HYPERLIPIDEMIA:  - Continued statin therapy for management of hyperlipidemia and processed refill request for the patient's cholesterol-lowering medication.    WEIGHT MANAGEMENT:  - Discussed weight loss medication options with the patient, including Zepbound (tirzepatide) and Wegovy.  - Will initiate insurance coverage investigation before considering self-pay options.  - Evaluated medical history for qualifying conditions (e.g., sleep apnea, cardiovascular disease) that might support insurance coverage.  - Educated patient about the current legal status of compounded weight loss  medications in [place] and reviewed self-pay pricing structures for branded options.  - If approved, will initiate medication at a low dose with gradual titration.  - Will send prescription to [facility] for prior authorization processing and schedule monthly follow-up visits if medication is initiated.    LABS:  - Ordered CBC, CMP, and vitamin D level, with future set of lab work for next year's visit.    FOLLOW-UP:  - Follow up in 1 year with Dr. Michele.          Discussed how to stay healthy including: diet, exercise, refraining from smoking and discussed screening exams / tests needed for age, sex and family Hx.    This note was generated with the assistance of ambient listening technology. Verbal consent was obtained by the patient and accompanying visitor(s) for the recording of patient appointment to facilitate this note. I attest to having reviewed and edited the generated note for accuracy, though some syntax or spelling errors may persist. Please contact the author of this note for any clarification.       RTC 2 weeks with PCP    Binta Graham PA-C             [1]   No current outpatient medications on file prior to visit.     No current facility-administered medications on file prior to visit.

## 2025-06-25 NOTE — ASSESSMENT & PLAN NOTE
- Body mass index is 37.9 kg/m².  - Recommendation for healthy diet and increasing exercise as tolerated with goal of 150min/week. Recommend weight loss.

## 2025-06-25 NOTE — TELEPHONE ENCOUNTER
----- Message from Binta Graham PA-C sent at 6/23/2025  8:29 AM CDT -----  A1C is 5.9, which is in the pre-diabetic range. I would recommend decrease in dietary carbs. TSH low. I would like to recheck this in 4 weeks with thyroid US. Orders placed. Please call pt to   schedule  ----- Message -----  From: Lab, Background User  Sent: 6/20/2025  11:09 AM CDT  To: Binta Graham PA-C

## 2025-06-27 ENCOUNTER — HOSPITAL ENCOUNTER (OUTPATIENT)
Dept: RADIOLOGY | Facility: HOSPITAL | Age: 60
Discharge: HOME | End: 2025-06-27
Attending: PHYSICIAN ASSISTANT
Payer: COMMERCIAL

## 2025-06-27 DIAGNOSIS — R79.89 LOW TSH LEVEL: ICD-10-CM

## 2025-06-27 PROCEDURE — 76536 US EXAM OF HEAD AND NECK: CPT | Mod: TC,PN

## 2025-06-27 PROCEDURE — 76536 US EXAM OF HEAD AND NECK: CPT | Mod: 26,,, | Performed by: STUDENT IN AN ORGANIZED HEALTH CARE EDUCATION/TRAINING PROGRAM

## 2025-06-30 ENCOUNTER — RESULTS FOLLOW-UP (OUTPATIENT)
Dept: FAMILY MEDICINE | Facility: CLINIC | Age: 60
End: 2025-06-30
Payer: COMMERCIAL

## 2025-06-30 DIAGNOSIS — E04.2 MULTINODULAR THYROID: Primary | ICD-10-CM

## 2025-07-08 ENCOUNTER — OFFICE VISIT (OUTPATIENT)
Dept: FAMILY MEDICINE | Facility: CLINIC | Age: 60
End: 2025-07-08
Payer: COMMERCIAL

## 2025-07-08 VITALS
HEART RATE: 92 BPM | SYSTOLIC BLOOD PRESSURE: 122 MMHG | DIASTOLIC BLOOD PRESSURE: 78 MMHG | TEMPERATURE: 98 F | OXYGEN SATURATION: 96 % | WEIGHT: 216.81 LBS | BODY MASS INDEX: 38.41 KG/M2 | HEIGHT: 63 IN

## 2025-07-08 DIAGNOSIS — E66.01 SEVERE OBESITY (BMI 35.0-39.9) WITH COMORBIDITY: ICD-10-CM

## 2025-07-08 DIAGNOSIS — E04.2 MULTINODULAR THYROID: Primary | ICD-10-CM

## 2025-07-08 DIAGNOSIS — I10 ESSENTIAL HYPERTENSION: ICD-10-CM

## 2025-07-08 PROCEDURE — 99214 OFFICE O/P EST MOD 30 MIN: CPT | Mod: S$GLB,,, | Performed by: PHYSICIAN ASSISTANT

## 2025-07-08 PROCEDURE — 3078F DIAST BP <80 MM HG: CPT | Mod: CPTII,S$GLB,, | Performed by: PHYSICIAN ASSISTANT

## 2025-07-08 PROCEDURE — 4010F ACE/ARB THERAPY RXD/TAKEN: CPT | Mod: CPTII,S$GLB,, | Performed by: PHYSICIAN ASSISTANT

## 2025-07-08 PROCEDURE — 3074F SYST BP LT 130 MM HG: CPT | Mod: CPTII,S$GLB,, | Performed by: PHYSICIAN ASSISTANT

## 2025-07-08 PROCEDURE — 3044F HG A1C LEVEL LT 7.0%: CPT | Mod: CPTII,S$GLB,, | Performed by: PHYSICIAN ASSISTANT

## 2025-07-08 PROCEDURE — 3008F BODY MASS INDEX DOCD: CPT | Mod: CPTII,S$GLB,, | Performed by: PHYSICIAN ASSISTANT

## 2025-07-08 NOTE — PROGRESS NOTES
" Patient ID: Rachael Alexis is a 59 y.o. female.     Chief Complaint: Follow-up    Ms. Shemar alexis presents today for follow up of lab results.    She reports abnormal thyroid function test with low TSH. Ultrasound showed thyroid nodules requiring further evaluation. She denies known personal history of thyroid issues but is uncertain about paternal medical history.    Complete blood count was normal. Hemoglobin A1C is in the pre-diabetic range with fasting blood sugar consistently in the hundreds, suggesting potential insulin dysfunction. Random insulin test was normal, ruling out insulin resistance. Cholesterol and Vitamin D levels are normal.    She reports recent weight gain following oral surgery and smoking cessation. She is attempting to reduce snacking behaviors.    She maintains an active lifestyle with line dancing twice weekly and walking a mile daily.    She is working to improve sleep habits by going to bed earlier.    Mother had metastatic breast cancer and passed away at age 56 after late-stage diagnosis.           Review of Systems  Review of Systems   Constitutional:  Negative for fever.   HENT:  Negative for ear pain and sinus pain.    Eyes:  Negative for discharge.   Respiratory:  Negative for cough and wheezing.    Cardiovascular:  Negative for chest pain and leg swelling.   Gastrointestinal:  Negative for diarrhea, nausea and vomiting.   Genitourinary:  Negative for urgency.   Musculoskeletal:  Negative for myalgias.   Skin:  Negative for rash.   Neurological:  Negative for weakness and headaches.   Psychiatric/Behavioral:  Negative for depression.        Currently Medications  Medications Ordered Prior to Encounter[1]    Physical  Exam  Vitals:    07/08/25 1045   BP: 122/78   BP Location: Right arm   Patient Position: Sitting   Pulse: 92   Temp: 97.8 °F (36.6 °C)   SpO2: 96%   Weight: 98.4 kg (216 lb 13.2 oz)   Height: 5' 3" (1.6 m)      Body mass index is 38.41 kg/m².  Wt Readings " from Last 3 Encounters:   07/09/25 98 kg (216 lb 0.8 oz)   07/08/25 98.4 kg (216 lb 13.2 oz)   06/20/25 97 kg (213 lb 15.3 oz)       Physical Exam  Vitals and nursing note reviewed.   Constitutional:       General: She is not in acute distress.     Appearance: She is obese. She is not ill-appearing.   HENT:      Head: Normocephalic and atraumatic.      Right Ear: External ear normal.      Left Ear: External ear normal.      Nose: Nose normal.      Mouth/Throat:      Mouth: Mucous membranes are moist.   Eyes:      Extraocular Movements: Extraocular movements intact.      Conjunctiva/sclera: Conjunctivae normal.   Cardiovascular:      Rate and Rhythm: Normal rate and regular rhythm.      Pulses: Normal pulses.      Heart sounds: No murmur heard.  Pulmonary:      Effort: Pulmonary effort is normal. No respiratory distress.      Breath sounds: No wheezing.   Abdominal:      General: There is no distension.      Palpations: Abdomen is soft. There is no mass.      Tenderness: There is no abdominal tenderness.   Musculoskeletal:         General: No swelling.      Cervical back: Normal range of motion.   Skin:     Coloration: Skin is not jaundiced.      Findings: No rash.   Neurological:      General: No focal deficit present.      Mental Status: She is alert and oriented to person, place, and time.   Psychiatric:         Mood and Affect: Mood normal.         Thought Content: Thought content normal.         Labs:    Complete Blood Count  Lab Results   Component Value Date    RBC 4.65 06/20/2025    HGB 12.8 06/20/2025    HCT 39.9 06/20/2025    MCV 86 06/20/2025    MCH 27.5 06/20/2025    MCHC 32.1 06/20/2025    RDW 13.7 06/20/2025     06/20/2025    MPV 9.6 06/20/2025    GRAN 7.1 06/21/2024    GRAN 70.9 06/21/2024    LYMPH 28.3 06/20/2025    LYMPH 1.51 06/20/2025    MONO 7.9 06/20/2025    MONO 0.42 06/20/2025    EOS 0.9 06/20/2025    EOS 0.05 06/20/2025    BASO 0.07 06/21/2024    EOSINOPHIL 0.8 06/21/2024    BASOPHIL  0.8 06/20/2025    BASOPHIL 0.04 06/20/2025    DIFFMETHOD Automated 06/21/2024       Comprehensive Metabolic Panel  Lab Results   Component Value Date    GLU 88 06/20/2025    BUN 13 06/20/2025    CREATININE 0.7 06/20/2025     06/20/2025    K 4.3 06/20/2025     06/20/2025    PROT 7.7 06/20/2025    ALBUMIN 4.2 06/20/2025    BILITOT 0.6 06/20/2025    AST 21 06/20/2025    ALKPHOS 103 06/20/2025    CO2 30 (H) 06/20/2025    ALT 21 06/20/2025    ANIONGAP 6 (L) 06/20/2025       TSH  Lab Results   Component Value Date    TSH 0.549 07/09/2025       Imaging:  US Thyroid  Narrative: EXAMINATION:  US THYROID    CLINICAL HISTORY:  .  Other specified abnormal findings of blood chemistry    TECHNIQUE:  Ultrasound of the thyroid and cervical lymph nodes was performed.    COMPARISON:  None.    FINDINGS:  The thyroid is normal in size.  Right lobe of the thyroid measures 6.4 x 2.0 x 2.2 cm.  Left lobe of the thyroid measures 6.4 x 3.0 x 2.6 cm.  Normal thyroid parenchyma.    There is a 1.4 x 1.0 x 1.0 cm hypoechoic nodule in the posterior right mid lobe.  There is a 0.6 x 0.4 x 0.6 cm isoechoic nodule in the right mid lobe.  There is a 0.5 x 0.4 x 0.4 cm hypoechoic nodule in the right lower lobe.  There is a 1.9 x 1.7 x 1.5 cm heterogeneous isoechoic to hypoechoic nodule in the left mid lobe.  There is a 2.9 x 2.5 x 1.8 cm mixed cystic and solid nodule in the left lower lobe.  There is a 1.5 x 0.9 x 1.0 cm hypoechoic nodule in the left lower lobe.  Impression: Multinodular thyroid gland.  FNA may be considered for the 1.9 cm heterogeneous left thyroid nodule and 1.5 cm hypoechoic left thyroid nodule.    Electronically signed by: Taqueria Briseno  Date:    06/27/2025  Time:    14:24      Assessment/Plan:    1. Multinodular thyroid  -     Ambulatory referral/consult  to [facility] Interventional RAD; Future; Expected date: 07/15/2025    2. Essential hypertension  Assessment & Plan:  - Chronic, stable  - Continue losartan  - Follow  with PCP        3. Severe obesity (BMI 35.0-39.9) with comorbidity  Assessment & Plan:  - Body mass index is 38.41 kg/m².  - Recommendation for healthy diet and increasing exercise as tolerated with goal of 150min/week. Recommend weight loss.           Assessment & Plan    E04.1 Nontoxic single thyroid nodule  R73.03 Prediabetes  R63.5 Abnormal weight gain  Z80.3 Family history of malignant neoplasm of breast    THYROID NODULES AND FUNCTION:  - Interpreted thyroid ultrasound results, revealing multiple nodules with one measuring 1.9 cm.  - TSH came back low, indicating possible over-functioning of the thyroid.  - Explained function of thyroid gland and its role in metabolism, including effects on body temperature, heart rate, blood pressure, sleep-wake cycle, and mood, as well as the relationship between TSH and T3/T4 hormones.  - Considering fine needle aspiration biopsy of thyroid nodule.  - Will evaluate options for expedited endocrinology consultation due to concerning findings, including interventional radiology for potential biopsy, endocrine surgery, or general endocrinology.  - Will research further evaluation options and contact patient by tomorrow to discuss next steps.    PREDIABETES:  - Reviewed lab results showing abnormal hemoglobin A1c in pre-diabetic range.  - Explained this test represents an average of glucose levels over 3 months.  - Noted patient's blood sugar might be regularly in the 150s, with metabolic panel showing sugar levels in the hundreds, though fasting sugar levels are within acceptable range.    WEIGHT GAIN:  - Noted patient reports weight gain since oral surgery and cessation of smoking.  - Decision on Wegovy prescription postponed pending further evaluation of thyroid nodules.    FAMILY HISTORY OF BREAST CANCER:  - Documented that mother passed away at 56 from breast cancer that had metastasized.    OTHER:  - Addressed concern about CO2 levels in lab work, explaining its limited  relevance in current context.          Discussed how to stay healthy including: diet, exercise, refraining from smoking and discussed screening exams / tests needed for age, sex and family Hx.    This note was generated with the assistance of ambient listening technology. Verbal consent was obtained by the patient and accompanying visitor(s) for the recording of patient appointment to facilitate this note. I attest to having reviewed and edited the generated note for accuracy, though some syntax or spelling errors may persist. Please contact the author of this note for any clarification.       RTC every 3-6 months with PCP, or PRN      Binta Graham PA-C           [1]   Current Outpatient Medications on File Prior to Visit   Medication Sig Dispense Refill    atorvastatin (LIPITOR) 20 MG tablet Take 1 tablet (20 mg total) by mouth once daily. 90 tablet 3    losartan (COZAAR) 50 MG tablet Take 1 tablet (50 mg total) by mouth once daily. 90 tablet 3    semaglutide, weight loss, (WEGOVY) 0.25 mg/0.5 mL PnIj Inject 0.25 mg into the skin every 7 days. (Patient not taking: Reported on 7/9/2025) 2 mL 0     No current facility-administered medications on file prior to visit.

## 2025-07-08 NOTE — PROGRESS NOTES
Endocrinology New Visit   07/09/2025      Subjective:      Chief Complaint:  Thyroid Nodule      History of Present Illness  Rachael Alexis is a 59 y.o. female with HLD, HTN, obesity, and tobacco use referred by Binta Graham for evaluation of thyroid nodules. Noted also abnormal TFTs on labs from 6/2025.      Thyroid nodules  Found 6/27/25    Thyroid US 6/2025  FINDINGS:  The thyroid is normal in size.  Right lobe of the thyroid measures 6.4 x 2.0 x 2.2 cm.  Left lobe of the thyroid measures 6.4 x 3.0 x 2.6 cm.  Normal thyroid parenchyma.     There is a 1.4 x 1.0 x 1.0 cm hypoechoic nodule in the posterior right mid lobe.  There is a 0.6 x 0.4 x 0.6 cm isoechoic nodule in the right mid lobe.  There is a 0.5 x 0.4 x 0.4 cm hypoechoic nodule in the right lower lobe.  There is a 1.9 x 1.7 x 1.5 cm heterogeneous isoechoic to hypoechoic nodule in the left mid lobe.  There is a 2.9 x 2.5 x 1.8 cm mixed cystic and solid nodule in the left lower lobe.  There is a 1.5 x 0.9 x 1.0 cm hypoechoic nodule in the left lower lobe.     Impression:  Multinodular thyroid gland.  FNA may be considered for the 1.9 cm heterogeneous left thyroid nodule and 1.5 cm hypoechoic left thyroid nodule.      FNA Hx: None      Risk Factors for Thyroid Cancer:  Hx of External Beam Radiation:None  Family Hx of Thyroid Cancer: Possibly father had thyroid cancer, but she is unsure. She was very young when he passed.     Tobacco use: 40 pack year hx (from 18- 58 years old, she quit last year. Smoked one PPD)    Denies rapid neck enlargement, difficulty swallowing, SOB, or hoarseness.       Regarding subclinical hyperthyroidism:  No known functional thyroid disorder and TFTs normal until most recent suppressed TSH on 6/20/2025 with normal fT4.    Lab Results   Component Value Date    TSH <0.026 (L) 06/20/2025    TSH 0.846 06/21/2024    TSH 0.975 11/01/2023    TSH 0.998 12/07/2021    TSH 1.537 04/02/2015    FREET4 0.92 06/20/2025     Weight:  "stable, no weight loss  Energy level: low to normal  Appetite: normal  Cold/heat intolerance: + heat intolerance  Bowel movements: Normal, once a day; soft, well-formed.  Tremor: none  Palpitations: none  Nervousness/mood changes: none    Exposure to iodine/contrast: No    Eye symptoms:  Double vision: No   Changes in eye appearance: No  Periorbital edema: No  Eye pain/pressure: No  Eye grittiness/dryness: No  Light sensitivity: No    No personal of family history of autoimmune conditions.       ROS:   As above    Objective:     /70 (BP Location: Left arm, Patient Position: Sitting)   Pulse 84   Ht 5' 3" (1.6 m)   Wt 98 kg (216 lb 0.8 oz)   SpO2 97%   BMI 38.27 kg/m²   BP Readings from Last 3 Encounters:   07/09/25 124/70   07/08/25 122/78   06/20/25 120/82     Wt Readings from Last 1 Encounters:   07/09/25 1347 98 kg (216 lb 0.8 oz)     Body mass index is 38.27 kg/m².      Physical Exam  Vitals reviewed.   Constitutional:       General: not in acute distress.     Appearance: not ill-appearing.   HENT:      Head: Normocephalic.      Mouth/Throat:      Mouth: Mucous membranes are moist.   Neck:      Thyroid: No thyroid mass, thyromegaly or thyroid tenderness. No cervical LAD.  Eyes:      Conjunctiva/sclera: Conjunctivae normal.   Cardiovascular:      Rate and Rhythm: Normal rate.   Pulmonary:      Effort: Pulmonary effort is normal.   Neurological:      Mental Status: alert and oriented to person, place, and time.   Psychiatric:         Mood and Affect: Mood normal.         Behavior: Behavior normal.       Lab Review:   Lab Results   Component Value Date    HGBA1C 5.9 (H) 06/20/2025     Lab Results   Component Value Date    CHOL 133 06/20/2025    HDL 39 (L) 06/20/2025    LDLCALC 76.8 06/20/2025    TRIG 86 06/20/2025    CHOLHDL 29.3 06/20/2025     Lab Results   Component Value Date     06/20/2025    K 4.3 06/20/2025     06/20/2025    CO2 30 (H) 06/20/2025    GLU 88 06/20/2025    BUN 13 " 06/20/2025    CREATININE 0.7 06/20/2025    CALCIUM 9.3 06/20/2025    PROT 7.7 06/20/2025    ALBUMIN 4.2 06/20/2025    BILITOT 0.6 06/20/2025    ALKPHOS 103 06/20/2025    AST 21 06/20/2025    ALT 21 06/20/2025    ANIONGAP 6 (L) 06/20/2025    ESTGFRAFRICA >60.0 12/08/2021    EGFRNONAA >60.0 12/08/2021    TSH <0.026 (L) 06/20/2025     Vitamin D   Date Value Ref Range Status   06/20/2025 30 30 - 96 ng/mL Final     Comment:     Vitamin D deficiency.........<10 ng/mL                              Vitamin D insufficiency......10-29 ng/mL       Vitamin D sufficiency........> or equal to 30 ng/mL  Vitamin D toxicity............>100 ng/mL           All relevant labs and imaging reviewed.    Assessment and Plan     1. Multiple thyroid nodules  Assessment & Plan:  - Diagnosed per Thyroid U/S on 6/27/25  - 3 nodules that meet criteria for FNA. 1.9cm heterogeneous nodule in left mid lobe, 1.5cm hypoechoic nodule in left lower lobe and 2.9cm nodule in the left lower lobe.   - At this time patient denies all hyperthyroid sxs other than heat intolerance. She has no hx of head/neck radiation. She is unsure of her family hx as her parents passed when she was young.   - Given patient's recent TSH was low, we discussed need for repetition first to confirm low value.  - If repeated TSH continues to be low, will proceed with RAIU scan to evaluate the 3 nodules in question which will then guide which of them will qualify for FNA.         2. Multinodular thyroid  -     Ambulatory referral/consult to Endocrinology    3. Subclinical hyperthyroidism  Assessment & Plan:  TSH < 0.026, FT4 0.92 on 6/20/25  - As above, patient denies overt hyperthyroid sxs at this time. Plan on repeating labs (TSH) which will then guide whether to pursue RAIU +/- FNA of suspicious nodules found on thyroid ultrasound.  - Will also obtain TRAb at the same time for the patient's convenience.            Labs today - TSH and TRAb  FNA x2 in our dept in 1mo or more  RTC  1yr       Ariella Rios MD  [facility]    Visit today included increased complexity associated with the care of the problems addressed and managing the longitudinal care of the patient due to the serious and/or complex managed problems      \I have reviewed and concur with Dr. Rios's history, physical, assessment, and plan.  I have personally interviewed and examined the patient.    Brandi Mitchell MD

## 2025-07-09 ENCOUNTER — LAB VISIT (OUTPATIENT)
Dept: LAB | Facility: HOSPITAL | Age: 60
End: 2025-07-09
Attending: STUDENT IN AN ORGANIZED HEALTH CARE EDUCATION/TRAINING PROGRAM
Payer: COMMERCIAL

## 2025-07-09 ENCOUNTER — OFFICE VISIT (OUTPATIENT)
Dept: ENDOCRINOLOGY | Facility: CLINIC | Age: 60
End: 2025-07-09
Payer: COMMERCIAL

## 2025-07-09 VITALS
HEIGHT: 63 IN | WEIGHT: 216.06 LBS | DIASTOLIC BLOOD PRESSURE: 70 MMHG | HEART RATE: 84 BPM | SYSTOLIC BLOOD PRESSURE: 124 MMHG | BODY MASS INDEX: 38.28 KG/M2 | OXYGEN SATURATION: 97 %

## 2025-07-09 DIAGNOSIS — E04.2 MULTIPLE THYROID NODULES: ICD-10-CM

## 2025-07-09 DIAGNOSIS — E05.90 SUBCLINICAL HYPERTHYROIDISM: ICD-10-CM

## 2025-07-09 DIAGNOSIS — E04.2 MULTIPLE THYROID NODULES: Primary | ICD-10-CM

## 2025-07-09 LAB — TSH SERPL-ACNC: 0.55 UIU/ML (ref 0.4–4)

## 2025-07-09 PROCEDURE — G2211 COMPLEX E/M VISIT ADD ON: HCPCS | Mod: S$GLB,,, | Performed by: STUDENT IN AN ORGANIZED HEALTH CARE EDUCATION/TRAINING PROGRAM

## 2025-07-09 PROCEDURE — 99999 PR PBB SHADOW E&M-EST. PATIENT-LVL IV: CPT | Mod: PBBFAC,,, | Performed by: STUDENT IN AN ORGANIZED HEALTH CARE EDUCATION/TRAINING PROGRAM

## 2025-07-09 PROCEDURE — 1160F RVW MEDS BY RX/DR IN RCRD: CPT | Mod: CPTII,S$GLB,, | Performed by: STUDENT IN AN ORGANIZED HEALTH CARE EDUCATION/TRAINING PROGRAM

## 2025-07-09 PROCEDURE — 3078F DIAST BP <80 MM HG: CPT | Mod: CPTII,S$GLB,, | Performed by: STUDENT IN AN ORGANIZED HEALTH CARE EDUCATION/TRAINING PROGRAM

## 2025-07-09 PROCEDURE — 3008F BODY MASS INDEX DOCD: CPT | Mod: CPTII,S$GLB,, | Performed by: STUDENT IN AN ORGANIZED HEALTH CARE EDUCATION/TRAINING PROGRAM

## 2025-07-09 PROCEDURE — 4010F ACE/ARB THERAPY RXD/TAKEN: CPT | Mod: CPTII,S$GLB,, | Performed by: STUDENT IN AN ORGANIZED HEALTH CARE EDUCATION/TRAINING PROGRAM

## 2025-07-09 PROCEDURE — 83520 IMMUNOASSAY QUANT NOS NONAB: CPT

## 2025-07-09 PROCEDURE — 36415 COLL VENOUS BLD VENIPUNCTURE: CPT

## 2025-07-09 PROCEDURE — 84443 ASSAY THYROID STIM HORMONE: CPT

## 2025-07-09 PROCEDURE — 1159F MED LIST DOCD IN RCRD: CPT | Mod: CPTII,S$GLB,, | Performed by: STUDENT IN AN ORGANIZED HEALTH CARE EDUCATION/TRAINING PROGRAM

## 2025-07-09 PROCEDURE — 99204 OFFICE O/P NEW MOD 45 MIN: CPT | Mod: S$GLB,,, | Performed by: STUDENT IN AN ORGANIZED HEALTH CARE EDUCATION/TRAINING PROGRAM

## 2025-07-09 PROCEDURE — 3044F HG A1C LEVEL LT 7.0%: CPT | Mod: CPTII,S$GLB,, | Performed by: STUDENT IN AN ORGANIZED HEALTH CARE EDUCATION/TRAINING PROGRAM

## 2025-07-09 PROCEDURE — 3074F SYST BP LT 130 MM HG: CPT | Mod: CPTII,S$GLB,, | Performed by: STUDENT IN AN ORGANIZED HEALTH CARE EDUCATION/TRAINING PROGRAM

## 2025-07-09 NOTE — ASSESSMENT & PLAN NOTE
- Diagnosed per Thyroid U/S on 6/27/25  - 3 nodules that meet criteria for FNA. 1.9cm heterogeneous nodule in left mid lobe, 1.5cm hypoechoic nodule in left lower lobe and 2.9cm nodule in the left lower lobe.   - At this time patient denies all hyperthyroid sxs other than heat intolerance. She has no hx of head/neck radiation. She is unsure of her family hx as her parents passed when she was young.   - Given patient's recent TSH was low, we discussed need for repetition first to confirm low value.  - If repeated TSH continues to be low, will proceed with RAIU scan to evaluate the 3 nodules in question which will then guide which of them will qualify for FNA.

## 2025-07-09 NOTE — ASSESSMENT & PLAN NOTE
TSH < 0.026, FT4 0.92 on 6/20/25  - As above, patient denies overt hyperthyroid sxs at this time. Plan on repeating labs (TSH) which will then guide whether to pursue RAIU +/- FNA of suspicious nodules found on thyroid ultrasound.  - Will also obtain TRAb at the same time for the patient's convenience.

## 2025-07-09 NOTE — PATIENT INSTRUCTIONS
Labs today     Thyroid nodule biopsies -- 3 nodules meet criteria now. We need to do repeat labs first because the results may impact which nodules need to be biopsied. For this reason we will schedule 2 nodule biopsies in 1 or more months so that we can adjust the plan if needed before the biopsy appointment.    Follow up with me in 1 year. Our schedules open 4 months in advance. This means you should call us in early March 2026 to schedule an appointment in July 2026.   Our clinic contact information is listed below.    Brandi Mitchell MD  [facility], [address]th Floor  [address] [place], [place], [address]  Office: [phone]

## 2025-07-11 LAB — TSH RECEP AB SER-ACNC: 1.13 IU/L (ref 0–1.75)

## 2025-07-14 ENCOUNTER — PATIENT MESSAGE (OUTPATIENT)
Dept: ENDOCRINOLOGY | Facility: CLINIC | Age: 60
End: 2025-07-14
Payer: COMMERCIAL

## 2025-07-14 ENCOUNTER — TELEPHONE (OUTPATIENT)
Dept: ENDOCRINOLOGY | Facility: HOSPITAL | Age: 60
End: 2025-07-14
Payer: COMMERCIAL

## 2025-07-14 NOTE — ASSESSMENT & PLAN NOTE
- Body mass index is 38.41 kg/m².  - Recommendation for healthy diet and increasing exercise as tolerated with goal of 150min/week. Recommend weight loss.

## 2025-07-14 NOTE — TELEPHONE ENCOUNTER
Called patient on the telephone to discuss her recent results (TSH and TRAb). Informed her that since her repeat TSH was not suppressed and came back WNL that we would be proceeding with the FNAs. She will be getting 2 FNAs done during her visit on 8/15/2025 followed by an additional FNA of the third nodule 6 weeks after the initial 2. Patient was agreeable to plan. All questions answered and concerns addressed.    Expected Date Of Service: 03/27/2023 Performing Laboratory: -1422 Bill For Surgical Tray: no Billing Type: Third-Party Bill

## 2025-08-15 ENCOUNTER — HOSPITAL ENCOUNTER (OUTPATIENT)
Dept: ENDOCRINOLOGY | Facility: CLINIC | Age: 60
Discharge: HOME | End: 2025-08-15
Attending: STUDENT IN AN ORGANIZED HEALTH CARE EDUCATION/TRAINING PROGRAM
Payer: COMMERCIAL

## 2025-08-15 DIAGNOSIS — E04.2 MULTIPLE THYROID NODULES: ICD-10-CM

## 2025-08-15 PROCEDURE — 10005 FNA BX W/US GDN 1ST LES: CPT | Mod: S$GLB,,, | Performed by: INTERNAL MEDICINE

## 2025-08-15 PROCEDURE — 88173 CYTOPATH EVAL FNA REPORT: CPT | Mod: TC,91

## 2025-08-15 PROCEDURE — 10006 FNA BX W/US GDN EA ADDL: CPT | Mod: S$GLB,,, | Performed by: INTERNAL MEDICINE

## 2025-08-19 LAB
ESTROGEN SERPL-MCNC: NORMAL PG/ML
ESTROGEN SERPL-MCNC: NORMAL PG/ML
INSULIN SERPL-ACNC: NORMAL U[IU]/ML
INSULIN SERPL-ACNC: NORMAL U[IU]/ML
LAB AP CLINICAL INFORMATION: NORMAL
LAB AP CLINICAL INFORMATION: NORMAL
LAB AP GROSS DESCRIPTION: NORMAL
LAB AP GROSS DESCRIPTION: NORMAL
LAB AP NON-GYN INTERPRETATION SPECIMEN 1: NORMAL
LAB AP NON-GYN INTERPRETATION SPECIMEN 1: NORMAL
LAB AP PERFORMING LOCATION(S): NORMAL
LAB AP PERFORMING LOCATION(S): NORMAL